# Patient Record
Sex: MALE | Race: WHITE
[De-identification: names, ages, dates, MRNs, and addresses within clinical notes are randomized per-mention and may not be internally consistent; named-entity substitution may affect disease eponyms.]

---

## 2019-02-04 ENCOUNTER — HOSPITAL ENCOUNTER (OUTPATIENT)
Dept: HOSPITAL 53 - M LAB REF | Age: 64
End: 2019-02-04
Attending: INTERNAL MEDICINE
Payer: MEDICARE

## 2019-02-04 DIAGNOSIS — I15.0: Primary | ICD-10-CM

## 2019-06-21 ENCOUNTER — HOSPITAL ENCOUNTER (EMERGENCY)
Dept: HOSPITAL 53 - M ED | Age: 64
Discharge: HOME | End: 2019-06-21
Payer: MEDICARE

## 2019-06-21 VITALS — SYSTOLIC BLOOD PRESSURE: 179 MMHG | DIASTOLIC BLOOD PRESSURE: 108 MMHG

## 2019-06-21 VITALS — BODY MASS INDEX: 39.28 KG/M2 | WEIGHT: 280.58 LBS | HEIGHT: 71 IN

## 2019-06-21 VITALS — DIASTOLIC BLOOD PRESSURE: 85 MMHG | SYSTOLIC BLOOD PRESSURE: 161 MMHG

## 2019-06-21 DIAGNOSIS — N40.0: ICD-10-CM

## 2019-06-21 DIAGNOSIS — I10: Primary | ICD-10-CM

## 2019-06-21 DIAGNOSIS — Z79.82: ICD-10-CM

## 2019-06-21 DIAGNOSIS — Z79.84: ICD-10-CM

## 2019-06-21 DIAGNOSIS — E11.9: ICD-10-CM

## 2019-06-21 DIAGNOSIS — Z88.8: ICD-10-CM

## 2019-06-21 DIAGNOSIS — M10.9: ICD-10-CM

## 2019-06-21 DIAGNOSIS — R05: ICD-10-CM

## 2019-06-21 DIAGNOSIS — Z79.899: ICD-10-CM

## 2019-06-21 DIAGNOSIS — E78.5: ICD-10-CM

## 2019-06-21 LAB
ALBUMIN SERPL BCG-MCNC: 3.4 GM/DL (ref 3.2–5.2)
ALT SERPL W P-5'-P-CCNC: 26 U/L (ref 12–78)
BASE EXCESS BLDA CALC-SCNC: 0.9 MMOL/L (ref -2–2)
BASOPHILS # BLD AUTO: 0 10^3/UL (ref 0–0.2)
BASOPHILS NFR BLD AUTO: 0.4 % (ref 0–1)
BILIRUB CONJ SERPL-MCNC: < 0.1 MG/DL (ref 0–0.2)
BILIRUB SERPL-MCNC: 0.2 MG/DL (ref 0.2–1)
BUN SERPL-MCNC: 19 MG/DL (ref 7–18)
CALCIUM SERPL-MCNC: 8.4 MG/DL (ref 8.8–10.2)
CHLORIDE SERPL-SCNC: 100 MEQ/L (ref 98–107)
CK MB CFR.DF SERPL CALC: 0.84
CK MB SERPL-MCNC: 1.7 NG/ML (ref ?–3.6)
CK SERPL-CCNC: 202 U/L (ref 39–308)
CO2 BLDA CALC-SCNC: 25.7 MEQ/L (ref 23–31)
CO2 SERPL-SCNC: 28 MEQ/L (ref 21–32)
CREAT SERPL-MCNC: 1.44 MG/DL (ref 0.7–1.3)
D DIMER PPP DDU-MCNC: 807.83 NG/ML (ref ?–500)
EOSINOPHIL # BLD AUTO: 0 10^3/UL (ref 0–0.5)
EOSINOPHIL NFR BLD AUTO: 0.1 % (ref 0–3)
GFR SERPL CREATININE-BSD FRML MDRD: 52.6 ML/MIN/{1.73_M2} (ref 49–?)
GLUCOSE SERPL-MCNC: 117 MG/DL (ref 70–100)
HCO3 BLDA-SCNC: 24.6 MEQ/L (ref 22–26)
HCO3 STD BLDA-SCNC: 25.2 MEQ/L (ref 22–26)
HCT VFR BLD AUTO: 39.1 % (ref 42–52)
HGB BLD-MCNC: 13.4 G/DL (ref 13.5–17.5)
INR PPP: 1.03
LYMPHOCYTES # BLD AUTO: 0.7 10^3/UL (ref 1.5–4.5)
LYMPHOCYTES NFR BLD AUTO: 9.9 % (ref 24–44)
MCH RBC QN AUTO: 31.6 PG (ref 27–33)
MCHC RBC AUTO-ENTMCNC: 34.3 G/DL (ref 32–36.5)
MCV RBC AUTO: 92.2 FL (ref 80–96)
MONOCYTES # BLD AUTO: 0.4 10^3/UL (ref 0–0.8)
MONOCYTES NFR BLD AUTO: 5.8 % (ref 0–5)
NEUTROPHILS # BLD AUTO: 6 10^3/UL (ref 1.8–7.7)
NEUTROPHILS NFR BLD AUTO: 82.7 % (ref 36–66)
NT-PRO BNP: 358 PG/ML (ref ?–125)
PCO2 BLDA: 36.5 MMHG (ref 35–45)
PH BLDA: 7.45 UNITS (ref 7.35–7.45)
PLATELET # BLD AUTO: 236 10^3/UL (ref 150–450)
PO2 BLDA: 78.3 MMHG (ref 75–100)
POTASSIUM SERPL-SCNC: 4 MEQ/L (ref 3.5–5.1)
PROT SERPL-MCNC: 7 GM/DL (ref 6.4–8.2)
PROTHROMBIN TIME: 13.2 SECONDS (ref 11.8–14)
RBC # BLD AUTO: 4.24 10^6/UL (ref 4.3–6.1)
SAO2 % BLDA: 96 % (ref 95–99)
SODIUM SERPL-SCNC: 138 MEQ/L (ref 136–145)
TROPONIN I SERPL-MCNC: < 0.02 NG/ML (ref ?–0.1)
TSH SERPL DL<=0.005 MIU/L-ACNC: 0.46 UIU/ML (ref 0.36–3.74)
WBC # BLD AUTO: 7.3 10^3/UL (ref 4–10)

## 2019-06-21 PROCEDURE — 82803 BLOOD GASES ANY COMBINATION: CPT

## 2019-06-21 PROCEDURE — 80076 HEPATIC FUNCTION PANEL: CPT

## 2019-06-21 PROCEDURE — 96375 TX/PRO/DX INJ NEW DRUG ADDON: CPT

## 2019-06-21 PROCEDURE — 84484 ASSAY OF TROPONIN QUANT: CPT

## 2019-06-21 PROCEDURE — 85610 PROTHROMBIN TIME: CPT

## 2019-06-21 PROCEDURE — 83880 ASSAY OF NATRIURETIC PEPTIDE: CPT

## 2019-06-21 PROCEDURE — 85025 COMPLETE CBC W/AUTO DIFF WBC: CPT

## 2019-06-21 PROCEDURE — 71046 X-RAY EXAM CHEST 2 VIEWS: CPT

## 2019-06-21 PROCEDURE — 94640 AIRWAY INHALATION TREATMENT: CPT

## 2019-06-21 PROCEDURE — 93005 ELECTROCARDIOGRAM TRACING: CPT

## 2019-06-21 PROCEDURE — 82553 CREATINE MB FRACTION: CPT

## 2019-06-21 PROCEDURE — 80048 BASIC METABOLIC PNL TOTAL CA: CPT

## 2019-06-21 PROCEDURE — 70450 CT HEAD/BRAIN W/O DYE: CPT

## 2019-06-21 PROCEDURE — 93041 RHYTHM ECG TRACING: CPT

## 2019-06-21 PROCEDURE — 99285 EMERGENCY DEPT VISIT HI MDM: CPT

## 2019-06-21 PROCEDURE — 82550 ASSAY OF CK (CPK): CPT

## 2019-06-21 PROCEDURE — 84443 ASSAY THYROID STIM HORMONE: CPT

## 2019-06-21 PROCEDURE — 96374 THER/PROPH/DIAG INJ IV PUSH: CPT

## 2019-06-21 PROCEDURE — 87040 BLOOD CULTURE FOR BACTERIA: CPT

## 2019-06-21 PROCEDURE — 36600 WITHDRAWAL OF ARTERIAL BLOOD: CPT

## 2019-06-21 PROCEDURE — 85379 FIBRIN DEGRADATION QUANT: CPT

## 2019-06-21 PROCEDURE — 71275 CT ANGIOGRAPHY CHEST: CPT

## 2019-06-21 NOTE — REPVR
EXAM: 

 CT Head Without Contrast 



EXAM DATE/TIME: 

 6/21/2019 9:32 PM 



CLINICAL HISTORY: 

 63 years old, male; Pain; Headache 



TECHNIQUE: 

 Imaging protocol: Axial computed tomography images of the head without 

contrast. 

 Radiation optimization: All CT scans at this facility use at least one of 

these dose optimization techniques: automated exposure control; mA and/or kV 

adjustment per patient size (includes targeted exams where dose is matched to 

clinical indication); or iterative reconstruction. 



COMPARISON: 

 MRI-Brain without Contrast 11/19/2015 3:59 PM 



FINDINGS: 

 Brain: There is parenchymal volume loss. White matter changes are demonstrated 

in the subcortical, centrum semiovale and periventricular white matter 

consistent with small vessel white matter angiopathic gliosis. 

 Ventricles: Normal. No ventriculomegaly. 

 Bones/joints: Unremarkable. No acute fracture. 

 Sinuses: Visualized sinuses are unremarkable. No fluid levels. 

 Mastoid air cells: Visualized mastoid air cells are well aerated. No mastoid 

effusion. 

 Soft tissues: Unremarkable. 



IMPRESSION: 

There is parenchymal volume loss. White matter changes are demonstrated in the 

subcortical, centrum semiovale and periventricular white matter consistent with 

small vessel white matter angiopathic gliosis. 



Electronically signed by: Reddy Jc On 06/21/2019  22:13:28 PM

## 2019-06-21 NOTE — REPVR
EXAM: 

 CT Angiography Chest With Contrast 



EXAM DATE/TIME: 

 6/21/2019 9:32 PM 



CLINICAL HISTORY: 

 63 years old, male; Shortness of breath; Additional info: Chest pain/shortness 

of breath 



TECHNIQUE: 

 Imaging protocol: Axial computed tomographic angiography images of the chest 

with intravenous contrast using CT angiography protocol. Coronal and sagittal 

reformatted images were created and reviewed. 

 3D rendering: MIP reconstructed images were created and reviewed. 

 Radiation optimization: All CT scans at this facility use at least one of 

these dose optimization techniques: automated exposure control; mA and/or kV 

adjustment per patient size (includes targeted exams where dose is matched to 

clinical indication); or iterative reconstruction. 

 Contrast material: ISOVUE 370; Contrast volume: 75 ml; Contrast route: IV; 



COMPARISON: 

 CR Chest, 2 view PA, Lat 6/21/2019 7:44 PM 



FINDINGS: 

 Pulmonary arteries: There are no pulmonary emboli. 

 Aorta: The aorta demonstrates mild atherosclerotic calcification. There is no 

aortic dissection or aneurysm. 

 Lungs: There is bibasilar compressive atelectasis. Lungs otherwise clear.

 Pleural space: Unremarkable. No pneumothorax. No pleural effusion. 

 Heart: Unremarkable. No cardiomegaly. No pericardial effusion. 

 Lymph nodes: Unremarkable. No enlarged lymph nodes. 

 Bones/joints: The spine demonstrates mild degenerative changes. 

 Soft tissues: Unremarkable. 



IMPRESSION: 

1. There is no aortic dissection or aneurysm. 

2. There are no pulmonary emboli. 



Electronically signed by: Reddy Jc On 06/21/2019  22:17:51 PM

## 2019-06-22 NOTE — REP
PA and lateral chest:

Comparison is the portable chest dated 11/19/2015.

The lung fields are clear.

The cardiac size is normal.

The evan, mediastinum, and skeletal structures are unremarkable.

Impression:

Negative PA and lateral chest.

 

 

Electronically Signed by

Dk Guzman MD 06/22/2019 09:06 A

## 2019-06-23 NOTE — ECGEPIP
[facility] - ED

                                       

                                       Test Date:    2019

Pat Name:     CARRILLO JETT              Department:   

Patient ID:   [id number]                 Room:         -

Gender:       Male                     Technician:   SUJEY

:          1955               Requested By: FAY DIALLO

Order Number: [id number]     Reading MD:   David Pritchett

                                 Measurements

Intervals                              Axis          

Rate:         67                       P:            33

CT:           180                      QRS:          

QRSD:         101                      T:            79

QT:           395                                    

QTc:          419                                    

                           Interpretive Statements

SINUS RHYTHM

LEFT ATRIAL ENLARGEMENT

BORDERLINE LEFT AXIS DEVIATION

LEFT VENTRICULAR HYPERTROPHY AND ST-T CHANGE

NO PRIORS FOR COMPARISON

Electronically Signed on 2019 6:48:05 EDT by David Pritchett

## 2019-11-04 ENCOUNTER — HOSPITAL ENCOUNTER (OUTPATIENT)
Dept: HOSPITAL 53 - M LAB REF | Age: 64
End: 2019-11-04
Attending: INTERNAL MEDICINE
Payer: MEDICARE

## 2019-11-04 DIAGNOSIS — E03.9: Primary | ICD-10-CM

## 2019-11-04 LAB
T4 FREE SERPL-MCNC: 0.96 NG/DL (ref 0.76–1.46)
TSH SERPL DL<=0.005 MIU/L-ACNC: 1.68 UIU/ML (ref 0.36–3.74)

## 2021-08-10 ENCOUNTER — HOSPITAL ENCOUNTER (OUTPATIENT)
Dept: HOSPITAL 53 - M LAB REF | Age: 66
End: 2021-08-10
Attending: INTERNAL MEDICINE
Payer: MEDICARE

## 2021-08-10 DIAGNOSIS — E83.42: Primary | ICD-10-CM
